# Patient Record
Sex: FEMALE | Race: WHITE | HISPANIC OR LATINO | Employment: FULL TIME | URBAN - NONMETROPOLITAN AREA
[De-identification: names, ages, dates, MRNs, and addresses within clinical notes are randomized per-mention and may not be internally consistent; named-entity substitution may affect disease eponyms.]

---

## 2020-02-07 PROBLEM — O99.513 ASTHMA AFFECTING PREGNANCY IN THIRD TRIMESTER: Status: ACTIVE | Noted: 2020-02-07

## 2024-04-24 ENCOUNTER — OFFICE VISIT (OUTPATIENT)
Dept: URGENT CARE | Facility: CLINIC | Age: 29
End: 2024-04-24
Payer: COMMERCIAL

## 2024-04-24 VITALS
OXYGEN SATURATION: 99 % | RESPIRATION RATE: 14 BRPM | DIASTOLIC BLOOD PRESSURE: 76 MMHG | WEIGHT: 187.2 LBS | HEART RATE: 97 BPM | BODY MASS INDEX: 35.34 KG/M2 | SYSTOLIC BLOOD PRESSURE: 112 MMHG | HEIGHT: 61 IN | TEMPERATURE: 98 F

## 2024-04-24 DIAGNOSIS — J32.9 BACTERIAL SINUSITIS: ICD-10-CM

## 2024-04-24 DIAGNOSIS — B96.89 BACTERIAL SINUSITIS: ICD-10-CM

## 2024-04-24 DIAGNOSIS — R51.9 ACUTE NONINTRACTABLE HEADACHE, UNSPECIFIED HEADACHE TYPE: ICD-10-CM

## 2024-04-24 PROCEDURE — 99203 OFFICE O/P NEW LOW 30 MIN: CPT

## 2024-04-24 PROCEDURE — 3074F SYST BP LT 130 MM HG: CPT

## 2024-04-24 PROCEDURE — 3078F DIAST BP <80 MM HG: CPT

## 2024-04-24 RX ORDER — AMOXICILLIN AND CLAVULANATE POTASSIUM 875; 125 MG/1; MG/1
1 TABLET, FILM COATED ORAL 2 TIMES DAILY
Qty: 14 TABLET | Refills: 0 | Status: SHIPPED | OUTPATIENT
Start: 2024-04-24 | End: 2024-05-01

## 2024-04-24 RX ORDER — AZELASTINE HYDROCHLORIDE, FLUTICASONE PROPIONATE 137; 50 UG/1; UG/1
1 SPRAY, METERED NASAL 2 TIMES DAILY
Qty: 23 G | Refills: 0 | Status: SHIPPED | OUTPATIENT
Start: 2024-04-24

## 2024-04-24 RX ORDER — KETOROLAC TROMETHAMINE 30 MG/ML
15 INJECTION, SOLUTION INTRAMUSCULAR; INTRAVENOUS ONCE
Status: COMPLETED | OUTPATIENT
Start: 2024-04-24 | End: 2024-04-24

## 2024-04-24 RX ADMIN — KETOROLAC TROMETHAMINE 15 MG: 30 INJECTION, SOLUTION INTRAMUSCULAR; INTRAVENOUS at 12:42

## 2024-04-24 NOTE — PROGRESS NOTES
"Subjective:   Erika Mcdonnell is a 28 y.o. female who presents for Sinusitis (Sinus pressure on R side of race, watery R eye, headache x 3 months)      HPI:    Patient presents to urgent care with concerns of three months of rhinorrhea, nasal congestion, headache, sore throat, dry cough.    Mild improvement with Advil, warm showers, nasal rinses, antihistamines.   Reports worsening of symptoms in the past 1-2 days.   Endorses very light dizziness, upper teeth throbbing, chills  Denies wheezing, chest tightness, SOB  No fever.  Tolerating diet.  Denies known sick contacts.     ROS As above in HPI    Medications:    Current Outpatient Medications on File Prior to Visit   Medication Sig Dispense Refill    Prenatal MV-Min-Fe Fum-FA-DHA (PRENATAL 1 PO) Take  by mouth.      oseltamivir (TAMIFLU) 75 MG Cap Take 1 Cap by mouth 2 times a day. (Patient not taking: Reported on 3/12/2020) 9 Cap 0     No current facility-administered medications on file prior to visit.        Allergies:   Shrimp (diagnostic)    Problem List:   Patient Active Problem List   Diagnosis    Supervision of other high risk pregnancies, third trimester    History of C/S x 2 - needs repeat, considering BTL    Late prenatal care    Asthma affecting pregnancy in third trimester        Surgical History:  Past Surgical History:   Procedure Laterality Date    PRIMARY C SECTION         Past Social Hx:   Social History     Tobacco Use    Smoking status: Never    Smokeless tobacco: Never   Vaping Use    Vaping Use: Never used   Substance Use Topics    Alcohol use: Not Currently    Drug use: Never          Problem list, medications, and allergies reviewed by myself today in Epic.     Objective:     /76 (BP Location: Left arm, Patient Position: Sitting, BP Cuff Size: Adult)   Pulse 97   Temp 36.7 °C (98 °F) (Temporal)   Resp 14   Ht 1.549 m (5' 1\")   Wt 84.9 kg (187 lb 3.2 oz)   SpO2 99%   BMI 35.37 kg/m²     Physical Exam  Vitals and nursing note " reviewed.   Constitutional:       General: She is not in acute distress.     Appearance: Normal appearance. She is not ill-appearing.   HENT:      Head: Normocephalic.      Right Ear: Ear canal normal. A middle ear effusion is present. No mastoid tenderness. Tympanic membrane is injected. Tympanic membrane is not erythematous or bulging.      Left Ear: Tympanic membrane and ear canal normal.      Nose: Mucosal edema, congestion and rhinorrhea present. Rhinorrhea is clear and purulent.      Right Sinus: Maxillary sinus tenderness and frontal sinus tenderness present.      Left Sinus: Maxillary sinus tenderness and frontal sinus tenderness present.      Mouth/Throat:      Mouth: Mucous membranes are moist.      Pharynx: Oropharynx is clear. Uvula midline. Posterior oropharyngeal erythema (Mod PND) present. No pharyngeal swelling or oropharyngeal exudate.      Tonsils: No tonsillar exudate.   Cardiovascular:      Rate and Rhythm: Normal rate and regular rhythm.      Heart sounds: Normal heart sounds. No murmur heard.     No friction rub. No gallop.   Pulmonary:      Effort: Pulmonary effort is normal. No respiratory distress.      Breath sounds: Normal breath sounds. No stridor. No wheezing, rhonchi or rales.   Chest:      Chest wall: No tenderness.   Abdominal:      General: Bowel sounds are normal.      Palpations: Abdomen is soft.   Musculoskeletal:      Cervical back: No rigidity or tenderness.      Right lower leg: No edema.      Left lower leg: No edema.   Lymphadenopathy:      Cervical: No cervical adenopathy.   Skin:     General: Skin is warm and dry.      Capillary Refill: Capillary refill takes less than 2 seconds.      Findings: No rash.   Neurological:      Mental Status: She is alert and oriented to person, place, and time.         Assessment/Plan:     Diagnosis and associated orders:   1. Bacterial sinusitis  - amoxicillin-clavulanate (AUGMENTIN) 875-125 MG Tab; Take 1 Tablet by mouth 2 times a day for 7  days.  Dispense: 14 Tablet; Refill: 0  - Azelastine-Fluticasone 137-50 MCG/ACT Suspension; Administer 1 Puff into affected nostril(S) 2 times a day.  Dispense: 23 g; Refill: 0  - Referral to establish with PCP    2. Acute nonintractable headache, unspecified headache type  - ketorolac (Toradol) injection 15 mg      Comments/MDM:     Increase clear fluid intake, rest.  Start Antihistamines, Flonase.   Salt water gargles, ice chips to soothe throat, lozenges  Increase emitted use humidifier by bedside.    Exposure to steam for expectoration.  Nasal saline as needed.  Use Tylenol for remainder of the day, instructed her to avoid NSAIDs for the rest of today.  Return to UC if not improved over the next 2-3 days   Referral to establish with PCP ordered  Work note provided       Return to clinic or go to ED if symptoms worsen or persist. Indications for ED discussed at length. Patient/Parent/Guardian voices understanding. Follow-up with your primary care provider in 3-5 days. Red flag symptoms discussed. All side effects of medication discussed including allergic response, GI upset, tendon injury, rash, sedation etc.    Please note that this dictation was created using voice recognition software. I have made a reasonable attempt to correct obvious errors, but I expect that there are errors of grammar and possibly content that I did not discover before finalizing the note.    This note was electronically signed by GEORGE Kothari

## 2024-04-24 NOTE — LETTER
April 24, 2024    To Whom It May Concern:         This is confirmation that Erika Mcdonnell attended her scheduled appointment with VERONA Boyer on 4/24/24.    Please excuse her from work until Monday 04/29/24 due to illness.         If you have any questions please do not hesitate to call me at the phone number listed below.    Sincerely,          BG Boyer.  [phone]

## 2024-05-03 ENCOUNTER — TELEMEDICINE (OUTPATIENT)
Dept: TELEHEALTH | Facility: TELEMEDICINE | Age: 29
End: 2024-05-03
Payer: COMMERCIAL

## 2024-05-03 DIAGNOSIS — J00 ACUTE RHINITIS: ICD-10-CM

## 2024-05-03 PROCEDURE — 99213 OFFICE O/P EST LOW 20 MIN: CPT | Mod: 95 | Performed by: PHYSICIAN ASSISTANT

## 2024-05-03 ASSESSMENT — ENCOUNTER SYMPTOMS
MYALGIAS: 0
NAUSEA: 0
CHILLS: 0
DIARRHEA: 0
CONSTIPATION: 0
SHORTNESS OF BREATH: 0
SORE THROAT: 0
ABDOMINAL PAIN: 0
HEADACHES: 0
EYE PAIN: 0
FEVER: 0
COUGH: 0
VOMITING: 0

## 2024-05-03 NOTE — PROGRESS NOTES
Telemedicine Visit: Established Patient     This evaluation was conducted via ZOOM using secure and encrypted videoconferencing technology. The patient was in a private location in the state of [place].    The patient's identity was confirmed and verbal consent was obtained for this virtual visit.    Subjective:   CC: sinus infection follow up  Erika Mcdonnell is a 28 y.o. female presenting for evaluation and management of seen on 4/24/24.  Completed antibiotics Augmentin, still having mild nasal pressure. Using azelastine nasal spray.     Review of Systems   Constitutional:  Negative for chills and fever.   HENT:  Positive for congestion. Negative for ear pain and sore throat.    Eyes:  Negative for pain.   Respiratory:  Negative for cough and shortness of breath.    Cardiovascular:  Negative for chest pain.   Gastrointestinal:  Negative for abdominal pain, constipation, diarrhea, nausea and vomiting.   Genitourinary:  Negative for dysuria.   Musculoskeletal:  Negative for myalgias.   Skin:  Negative for rash.   Neurological:  Negative for headaches.        Current medicines (including changes today)  Medications:    Azelastine-Fluticasone Susp  oseltamivir Caps  PRENATAL 1 PO    Allergies: Shrimp (diagnostic)    Problem List: Erika Mcdonnell does not have any pertinent problems on file.    Surgical History:  Past Surgical History:   Procedure Laterality Date    PRIMARY C SECTION         Past Social Hx: Erika Mcdonnell  reports that she has never smoked. She has never used smokeless tobacco. She reports that she does not currently use alcohol. She reports that she does not use drugs.     Past Family Hx:  Erika Mcdonnell family history includes Diabetes in her maternal grandmother; No Known Problems in her brother, father, mother, and sister.     Problem list, medications, and allergies reviewed by myself today in Epic.     Objective:   There were no vitals taken for this visit. Not taken due to virtual  visit.    Physical Exam:  Constitutional: Alert, no distress, well-groomed.  Skin: No rashes in visible areas.  Eye: Round. Conjunctiva clear, lids normal. No icterus.   ENMT: Lips pink without lesions, good dentition, moist mucous membranes. Phonation normal.  Neck: No masses, no thyromegaly. Moves freely without pain.  CV: Pulse as reported by patient is normal  Respiratory: Unlabored respiratory effort, no cough or audible wheeze  Psych: Alert and oriented x3, normal affect and mood.       Assessment and Plan:   The following treatment plan was discussed:     1. Acute rhinitis    Continue azelastine  Start antihistamine  Start nasal steroid  Do not advise continuing antibiotics       Follow-up: No follow-ups on file.

## 2024-06-12 ENCOUNTER — OFFICE VISIT (OUTPATIENT)
Dept: MEDICAL GROUP | Facility: PHYSICIAN GROUP | Age: 29
End: 2024-06-12
Payer: COMMERCIAL

## 2024-06-12 VITALS
TEMPERATURE: 97.2 F | WEIGHT: 195 LBS | OXYGEN SATURATION: 99 % | HEART RATE: 72 BPM | HEIGHT: 61 IN | SYSTOLIC BLOOD PRESSURE: 118 MMHG | DIASTOLIC BLOOD PRESSURE: 80 MMHG | BODY MASS INDEX: 36.82 KG/M2

## 2024-06-12 DIAGNOSIS — Z11.59 ENCOUNTER FOR HEPATITIS C SCREENING TEST FOR LOW RISK PATIENT: ICD-10-CM

## 2024-06-12 DIAGNOSIS — Z00.00 PHYSICAL EXAM, ANNUAL: ICD-10-CM

## 2024-06-12 DIAGNOSIS — J34.89 NASAL MASS: ICD-10-CM

## 2024-06-12 DIAGNOSIS — Z30.430 ENCOUNTER FOR INSERTION OF INTRAUTERINE CONTRACEPTIVE DEVICE (IUD): ICD-10-CM

## 2024-06-12 DIAGNOSIS — J32.1 CHRONIC FRONTAL SINUSITIS: ICD-10-CM

## 2024-06-12 DIAGNOSIS — D50.9 MICROCYTIC ANEMIA: ICD-10-CM

## 2024-06-12 PROCEDURE — 3074F SYST BP LT 130 MM HG: CPT | Performed by: PHYSICIAN ASSISTANT

## 2024-06-12 PROCEDURE — 3079F DIAST BP 80-89 MM HG: CPT | Performed by: PHYSICIAN ASSISTANT

## 2024-06-12 PROCEDURE — 99214 OFFICE O/P EST MOD 30 MIN: CPT | Performed by: PHYSICIAN ASSISTANT

## 2024-06-12 RX ORDER — AMOXICILLIN AND CLAVULANATE POTASSIUM 875; 125 MG/1; MG/1
1 TABLET, FILM COATED ORAL 2 TIMES DAILY
Qty: 28 TABLET | Refills: 0 | Status: SHIPPED | OUTPATIENT
Start: 2024-06-12

## 2024-06-12 SDOH — ECONOMIC STABILITY: HOUSING INSECURITY
IN THE LAST 12 MONTHS, WAS THERE A TIME WHEN YOU DID NOT HAVE A STEADY PLACE TO SLEEP OR SLEPT IN A SHELTER (INCLUDING NOW)?: NO

## 2024-06-12 SDOH — HEALTH STABILITY: PHYSICAL HEALTH: ON AVERAGE, HOW MANY MINUTES DO YOU ENGAGE IN EXERCISE AT THIS LEVEL?: PATIENT DECLINED

## 2024-06-12 SDOH — ECONOMIC STABILITY: INCOME INSECURITY: IN THE LAST 12 MONTHS, WAS THERE A TIME WHEN YOU WERE NOT ABLE TO PAY THE MORTGAGE OR RENT ON TIME?: NO

## 2024-06-12 SDOH — ECONOMIC STABILITY: HOUSING INSECURITY

## 2024-06-12 SDOH — ECONOMIC STABILITY: TRANSPORTATION INSECURITY
IN THE PAST 12 MONTHS, HAS LACK OF TRANSPORTATION KEPT YOU FROM MEETINGS, WORK, OR FROM GETTING THINGS NEEDED FOR DAILY LIVING?: NO

## 2024-06-12 SDOH — ECONOMIC STABILITY: FOOD INSECURITY: WITHIN THE PAST 12 MONTHS, THE FOOD YOU BOUGHT JUST DIDN'T LAST AND YOU DIDN'T HAVE MONEY TO GET MORE.: NEVER TRUE

## 2024-06-12 SDOH — ECONOMIC STABILITY: INCOME INSECURITY: HOW HARD IS IT FOR YOU TO PAY FOR THE VERY BASICS LIKE FOOD, HOUSING, MEDICAL CARE, AND HEATING?: PATIENT DECLINED

## 2024-06-12 SDOH — HEALTH STABILITY: PHYSICAL HEALTH
ON AVERAGE, HOW MANY DAYS PER WEEK DO YOU ENGAGE IN MODERATE TO STRENUOUS EXERCISE (LIKE A BRISK WALK)?: PATIENT DECLINED

## 2024-06-12 SDOH — ECONOMIC STABILITY: FOOD INSECURITY: WITHIN THE PAST 12 MONTHS, YOU WORRIED THAT YOUR FOOD WOULD RUN OUT BEFORE YOU GOT MONEY TO BUY MORE.: NEVER TRUE

## 2024-06-12 SDOH — ECONOMIC STABILITY: TRANSPORTATION INSECURITY
IN THE PAST 12 MONTHS, HAS LACK OF RELIABLE TRANSPORTATION KEPT YOU FROM MEDICAL APPOINTMENTS, MEETINGS, WORK OR FROM GETTING THINGS NEEDED FOR DAILY LIVING?: NO

## 2024-06-12 SDOH — ECONOMIC STABILITY: TRANSPORTATION INSECURITY
IN THE PAST 12 MONTHS, HAS THE LACK OF TRANSPORTATION KEPT YOU FROM MEDICAL APPOINTMENTS OR FROM GETTING MEDICATIONS?: NO

## 2024-06-12 SDOH — HEALTH STABILITY: MENTAL HEALTH
STRESS IS WHEN SOMEONE FEELS TENSE, NERVOUS, ANXIOUS, OR CAN'T SLEEP AT NIGHT BECAUSE THEIR MIND IS TROUBLED. HOW STRESSED ARE YOU?: NOT AT ALL

## 2024-06-12 ASSESSMENT — SOCIAL DETERMINANTS OF HEALTH (SDOH)
DO YOU BELONG TO ANY CLUBS OR ORGANIZATIONS SUCH AS CHURCH GROUPS UNIONS, FRATERNAL OR ATHLETIC GROUPS, OR SCHOOL GROUPS?: NO
HOW HARD IS IT FOR YOU TO PAY FOR THE VERY BASICS LIKE FOOD, HOUSING, MEDICAL CARE, AND HEATING?: PATIENT DECLINED
WITHIN THE PAST 12 MONTHS, YOU WORRIED THAT YOUR FOOD WOULD RUN OUT BEFORE YOU GOT THE MONEY TO BUY MORE: NEVER TRUE
HOW OFTEN DO YOU ATTENT MEETINGS OF THE CLUB OR ORGANIZATION YOU BELONG TO?: NEVER
ARE YOU MARRIED, WIDOWED, DIVORCED, SEPARATED, NEVER MARRIED, OR LIVING WITH A PARTNER?: NEVER MARRIED
HOW MANY DRINKS CONTAINING ALCOHOL DO YOU HAVE ON A TYPICAL DAY WHEN YOU ARE DRINKING: PATIENT DECLINED
HOW OFTEN DO YOU HAVE SIX OR MORE DRINKS ON ONE OCCASION: PATIENT DECLINED
ARE YOU MARRIED, WIDOWED, DIVORCED, SEPARATED, NEVER MARRIED, OR LIVING WITH A PARTNER?: NEVER MARRIED
HOW OFTEN DO YOU ATTEND CHURCH OR RELIGIOUS SERVICES?: NEVER
HOW OFTEN DO YOU ATTENT MEETINGS OF THE CLUB OR ORGANIZATION YOU BELONG TO?: NEVER
DO YOU BELONG TO ANY CLUBS OR ORGANIZATIONS SUCH AS CHURCH GROUPS UNIONS, FRATERNAL OR ATHLETIC GROUPS, OR SCHOOL GROUPS?: NO
HOW OFTEN DO YOU GET TOGETHER WITH FRIENDS OR RELATIVES?: MORE THAN THREE TIMES A WEEK
IN A TYPICAL WEEK, HOW MANY TIMES DO YOU TALK ON THE PHONE WITH FAMILY, FRIENDS, OR NEIGHBORS?: MORE THAN THREE TIMES A WEEK
HOW OFTEN DO YOU GET TOGETHER WITH FRIENDS OR RELATIVES?: MORE THAN THREE TIMES A WEEK
HOW OFTEN DO YOU ATTEND CHURCH OR RELIGIOUS SERVICES?: NEVER
HOW OFTEN DO YOU HAVE A DRINK CONTAINING ALCOHOL: PATIENT DECLINED
IN A TYPICAL WEEK, HOW MANY TIMES DO YOU TALK ON THE PHONE WITH FAMILY, FRIENDS, OR NEIGHBORS?: MORE THAN THREE TIMES A WEEK

## 2024-06-12 ASSESSMENT — LIFESTYLE VARIABLES
AUDIT-C TOTAL SCORE: -1
HOW OFTEN DO YOU HAVE SIX OR MORE DRINKS ON ONE OCCASION: PATIENT DECLINED
HOW MANY STANDARD DRINKS CONTAINING ALCOHOL DO YOU HAVE ON A TYPICAL DAY: PATIENT DECLINED
HOW OFTEN DO YOU HAVE A DRINK CONTAINING ALCOHOL: PATIENT DECLINED
SKIP TO QUESTIONS 9-10: 0

## 2024-06-12 ASSESSMENT — PATIENT HEALTH QUESTIONNAIRE - PHQ9: CLINICAL INTERPRETATION OF PHQ2 SCORE: 0

## 2024-06-12 NOTE — LETTER
June 12, 2024         Patient: Erika Mcdonnell   YOB: 1995   Date of Visit: 6/12/2024           To Whom it May Concern:    Erika Mcdonnell was seen in my clinic on 6/12/2024. Please excuse her from work.     If you have any questions or concerns, please don't hesitate to call.        Sincerely,           Deangelo Beltre P.A.-C.  Electronically Signed

## 2024-06-12 NOTE — PROGRESS NOTES
CC:    Chief Complaint   Patient presents with    [facility]     Sinus infection        HISTORY OF THE PRESENT ILLNESS: Patient is a 29 y.o. female presenting to [facility] primary care     Pt has been having sinusitis for past 5 months. Seen in walk in clinic two months ago and complete course of augmentin. She improved a lot with abx but her sinusitis relapse.   Pt blew a tissue like mass out of her nose 12 days ago. Did not keep it. Notes that she is having epistaxis.   Pt interested in starting on contraception. Notes that she gets heavy menses. Some irregularity to her periods but also wanting to prevent pregnancy. Last PAP was 4 years.   Pt having some bilateral ear pain for past 5 months.     No problem-specific Assessment & Plan notes found for this encounter.    Allergies: Shrimp (diagnostic)    Current Outpatient Medications Ordered in Epic   Medication Sig Dispense Refill    Azelastine-Fluticasone 137-50 MCG/ACT Suspension Administer 1 Puff into affected nostril(S) 2 times a day. (Patient not taking: Reported on 5/3/2024) 23 g 0     No current Epic-ordered facility-administered medications on file.       Past Medical History:   Diagnosis Date    Asthma        Past Surgical History:   Procedure Laterality Date    PRIMARY C SECTION         Social History     Tobacco Use    Smoking status: Never    Smokeless tobacco: Never   Vaping Use    Vaping status: Never Used   Substance Use Topics    Alcohol use: Not Currently    Drug use: Never       Social History     Social History Narrative    Not on file       Family History   Problem Relation Age of Onset    No Known Problems Mother     No Known Problems Father     Diabetes Maternal Grandmother     No Known Problems Sister     No Known Problems Brother        ROS:     - Constitutional: Negative for fever, chills, unexpected weight change, and fatigue/generalized weakness.     - HEENTPositive for sinus pain, ear pain, epistaxis: Negative for headaches, vision  "changes, hearing changes, ear pain, ear discharge, rhinorrhea,  sore throat, and neck pain.      - Respiratory: Negative for cough, sputum production, chest congestion, dyspnea, wheezing, and crackles.      - Cardiovascular: Negative for chest pain, palpitations, orthopnea, and bilateral lower extremity edema.     - Gastrointestinal:Positive for abd pain. Negative for heartburn, nausea, vomiting, hematochezia, melena, diarrhea, constipation, and greasy/foul-smelling stools.     - Genitourinary: Negative for dysuria, polyuria, hematuria, pyuria, urinary urgency, and urinary incontinence.     - Musculoskeletal: Negative for myalgias, back pain, and joint pain.     - Skin: Negative for rash, itching, cyanotic skin color change.     - Neurological: Negative for dizziness, tingling, tremors, focal sensory deficit, focal weakness and headaches.     - Endo/Heme/Allergies: Does not bruise/bleed easily.     - Psychiatric/Behavioral: Positive for anxiety.  Negative for depression, suicidal/homicidal ideation and memory loss.            .      Exam: /80   Pulse 72   Temp 36.2 °C (97.2 °F) (Temporal)   Ht 1.549 m (5' 1\")   Wt 88.5 kg (195 lb)   SpO2 99%  Body mass index is 36.84 kg/m².    General: Normal appearing. No acute distress.  Skin: Warm and dry.  No obvious lesions.  HEENT: Normocephalic. Eyes conjunctiva clear lids without ptosis, ears normal shape and contour  Cardiovascular: Regular rate and rhythm without murmur.   Respiratory: Clear to auscultation bilaterally, no rhonchi wheezing or rales.  Neurologic: Grossly nonfocal, A&O x3, gait normal,  Musculoskeletal: No deformity or swelling.   Extremities: No extremity cyanosis, clubbing, or edema.  Psych: Normal mood and affect. Judgment and insight is normal.    Please note that this dictation was created using voice recognition software. I have made every reasonable attempt to correct obvious errors, but I expect that there are errors of grammar and " possibly content that I did not discover before finalizing the note.      Assessment/Plan    1. Chronic frontal sinusitis  Will do extended course of Augmentin.  - amoxicillin-clavulanate (AUGMENTIN) 875-125 MG Tab; Take 1 Tablet by mouth 2 times a day.  Dispense: 28 Tablet; Refill: 0  - Referral to ENT    2. Nasal mass  Referral to ENT placed  - Referral to ENT    3. Encounter for insertion of intrauterine contraceptive device (IUD)    - Referral to OB/Gyn    4. Physical exam, annual  labs printed  - HEMOGLOBIN A1C; Future  - Comp Metabolic Panel; Future  - CBC WITH DIFFERENTIAL; Future  - Lipid Profile; Future  - TSH WITH REFLEX TO FT4; Future    5. Microcytic anemia  Review at next visit  - IRON/TOTAL IRON BIND; Future  - FERRITIN; Future    6. Encounter for hepatitis C screening test for low risk patient    - HEP C VIRUS ANTIBODY; Future